# Patient Record
Sex: MALE | Race: BLACK OR AFRICAN AMERICAN | Employment: PART TIME | URBAN - METROPOLITAN AREA
[De-identification: names, ages, dates, MRNs, and addresses within clinical notes are randomized per-mention and may not be internally consistent; named-entity substitution may affect disease eponyms.]

---

## 2017-08-07 ENCOUNTER — OP HISTORICAL/CONVERTED ENCOUNTER (OUTPATIENT)
Dept: OTHER | Age: 32
End: 2017-08-07

## 2017-10-02 ENCOUNTER — OP HISTORICAL/CONVERTED ENCOUNTER (OUTPATIENT)
Dept: OTHER | Age: 32
End: 2017-10-02

## 2019-08-21 ENCOUNTER — OFFICE VISIT (OUTPATIENT)
Dept: FAMILY MEDICINE CLINIC | Age: 34
End: 2019-08-21

## 2019-08-21 VITALS
BODY MASS INDEX: 27.3 KG/M2 | OXYGEN SATURATION: 99 % | HEART RATE: 59 BPM | RESPIRATION RATE: 16 BRPM | DIASTOLIC BLOOD PRESSURE: 68 MMHG | HEIGHT: 71 IN | TEMPERATURE: 98.1 F | WEIGHT: 195 LBS | SYSTOLIC BLOOD PRESSURE: 103 MMHG

## 2019-08-21 DIAGNOSIS — R55 VASOVAGAL SYNCOPE: ICD-10-CM

## 2019-08-21 DIAGNOSIS — F12.10 MARIJUANA ABUSE: ICD-10-CM

## 2019-08-21 DIAGNOSIS — Z00.00 WELL ADULT EXAM: Primary | ICD-10-CM

## 2019-08-21 DIAGNOSIS — N43.3 BILATERAL HYDROCELE: ICD-10-CM

## 2019-08-21 NOTE — PATIENT INSTRUCTIONS
Medhat Bragg M.D.  [address]  [address] [facility], [address]  Phone: [phone]  Fax: [phone]

## 2019-08-21 NOTE — PROGRESS NOTES
Lou Mathews is an 30 y.o. male who presents for well male exam and to discuss bilateral hydrocele and to discuss syncopal episode. Previous PCP: Adam Marie    Doing well. No complaints. Sexually active: Yes, female partners. Method of protection: condoms most of the time     Diet: good mix of carbohydrates, protein and fruits. 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 Bilateral hydroceles: Pt had U/S done at [facility] (8/2017). Personally reviewed U/S result: Tiny simple cysts present in left testis 4 and 2 mm. Pt denies problems with urinary frequency, urgency and dysuria. Denies scrotal pain. Hx of STDs: Hx of Chlamydia >5 years ago: treated appropriately per Pt. Pt would like to have gonorrhea and chlamydia done today. Syncope: Pt states that he had a syncopal episode after he donated plasma a few months ago. Did not go to urgent care or ED for evaluation. Pt states that he did not hydrate and did not eat after he donated plasma. Syncope lasted for a few min and Pt returned back to baseline immediately after. Also states that he smoked marijuana which may have contributed. He denies recent syncope, HA, dizziness, lightheadedness, SOB, CP and palpitations    Social Hx: smokes 3 black and milds daily and marijuana weekly. Has 3-4 beers a week. Pt denies any other drug use. Allergies- reviewed:   No Known Allergies      Medications- reviewed:   No current outpatient medications on file. No current facility-administered medications for this visit. Past Medical History- reviewed:  History reviewed. No pertinent past medical history.       Past Surgical History- reviewed:   Past Surgical History:   Procedure Laterality Date    HX HERNIA REPAIR      testicular hernia when he was a baby      Family History - reviewed:  Family History   Problem Relation Age of Onset    Hypertension Mother     Diabetes Father     Heart Disease Father Social History - reviewed:  Social History     Socioeconomic History    Marital status: SINGLE     Spouse name: Not on file    Number of children: Not on file    Years of education: Not on file    Highest education level: Not on file   Occupational History    Not on file   Social Needs    Financial resource strain: Not on file    Food insecurity:     Worry: Not on file     Inability: Not on file    Transportation needs:     Medical: Not on file     Non-medical: Not on file   Tobacco Use    Smoking status: Never Smoker    Smokeless tobacco: Never Used   Substance and Sexual Activity    Alcohol use: Yes     Comment: occasionally    Drug use: Yes     Types: Marijuana    Sexual activity: Yes     Partners: Female     Birth control/protection: Condom   Lifestyle    Physical activity:     Days per week: Not on file     Minutes per session: Not on file    Stress: Not on file   Relationships    Social connections:     Talks on phone: Not on file     Gets together: Not on file     Attends Jehovah's witness service: Not on file     Active member of club or organization: Not on file     Attends meetings of clubs or organizations: Not on file     Relationship status: Not on file    Intimate partner violence:     Fear of current or ex partner: Not on file     Emotionally abused: Not on file     Physically abused: Not on file     Forced sexual activity: Not on file   Other Topics Concern    Not on file   Social History Narrative    Not on file     Immunizations - reviewed: There is no immunization history on file for this patient.     Health Maintenance reviewed -   HIV testing: would like to get done today    Review of Systems   CONSTITUTIONAL: Denies: fever, chills, weakness, fatigue  EYES: Denies: blurry vision, decreased vision  ENT: Denies: sore throat, nasal congestion, nasal discharge, epistaxis  CARDIOVASCULAR: Denies: chest pain, dyspnea on exertion  RESPIRATORY: Denies: cough, shortness of breath  ENDOCRINE: Denies: polydipsia/polyuria, palpitations  GI: Denies: abdominal pain, flank pain, nausea, vomiting, diarrhea, constipation  : Denies: dysuria, frequency/urgency  NEURO: Denies: dizzy/vertigo, headache  MUSCULOSKELETAL: Denies: back pain, joint pain, muscle pain  SKIN: Denies: rash, itching  PSYCH: Denies: anxiety, depression    Objective:     Visit Vitals  /68   Pulse (!) 59   Temp 98.1 °F (36.7 °C) (Oral)   Resp 16   Ht 5' 11\" (1.803 m)   Wt 195 lb (88.5 kg)   SpO2 99%   BMI 27.20 kg/m²     General appearance - alert, well appearing, and in no distress  Eyes - pupils equal and reactive, extraocular eye movements intact  Ears - bilateral TM's and external ear canals normal  Nose - normal and patent, no erythema, discharge or polyps  Mouth - mucous membranes moist, pharynx normal without lesions  Neck - supple, no significant adenopathy  Chest - clear to auscultation, no wheezes, rales or rhonchi, symmetric air entry  Heart - normal rate, regular rhythm, normal S1, S2, no murmurs, rubs, clicks or gallops  Abdomen - soft, nontender, nondistended, no masses or organomegaly  Neurological - alert, oriented, normal speech, no focal findings or movement disorder noted  Musculoskeletal - no joint tenderness, deformity or swelling  Extremities - peripheral pulses normal, no pedal edema, no clubbing or cyanosis  Skin - normal coloration and turgor, no rashes, no suspicious skin lesions noted  : small palpable nodules (pea sized) palpated on bilateral testes. Assessment:       ICD-10-CM ICD-9-CM    1. Well adult exam Z00.00 V70.0 CBC W/O DIFF      METABOLIC PANEL, COMPREHENSIVE      HIV 1/2 AG/AB, 4TH GENERATION,W RFLX CONFIRM      CHLAMYDIA/GC PCR      LIPID PANEL   2. Bilateral hydrocele N43.3 603.9 REFERRAL TO UROLOGY   3. Vasovagal syncope R55 780.2    4. Marijuana abuse F12.10 305.20      Plan:   1.  Well adult exam:   - Counseled re: diet, exercise, healthy lifestyle  - Appropriate labs, vaccines, imaging studies, and referrals ordered as listed above  - The patient was counseled on the dangers of tobacco use, and was advised to quit. Reviewed strategies to maximize success, including written materials  - Counseled pt on about dangers of marijuana use and dangers of smoking. 2. Bilateral hydrocele: U/S scrotum: Tiny simple cysts present in left testis 4 and 2 mm. Pt is asymptomatic.  - Referred pt to urology per Pt request.     3. Vasovagal syncope: likely vasovagal after plasma donation and lack of PO hydration after.   - f/u in 2 weeks for further workup or go to ED if symptoms reoccur. I have discussed the diagnosis with the patient and the intended plan as seen in the above orders. The patient has received an after-visit summary and questions were answered concerning future plans. I have discussed medication side effects and warnings with the patient as well. Informed pt to return to the office if new symptoms arise. John Khan MD  Family Medicine Resident  PGY-3    Patient seen with Dr. Gloria Mario, attending physician.

## 2019-08-22 LAB
ALBUMIN SERPL-MCNC: 4.9 G/DL (ref 3.5–5.5)
ALBUMIN/GLOB SERPL: 2.3 {RATIO} (ref 1.2–2.2)
ALP SERPL-CCNC: 73 IU/L (ref 39–117)
ALT SERPL-CCNC: 43 IU/L (ref 0–44)
AST SERPL-CCNC: 29 IU/L (ref 0–40)
BILIRUB SERPL-MCNC: 0.3 MG/DL (ref 0–1.2)
BUN SERPL-MCNC: 13 MG/DL (ref 6–20)
BUN/CREAT SERPL: 12 (ref 9–20)
C TRACH RRNA SPEC QL NAA+PROBE: NEGATIVE
CALCIUM SERPL-MCNC: 9.6 MG/DL (ref 8.7–10.2)
CHLORIDE SERPL-SCNC: 104 MMOL/L (ref 96–106)
CHOLEST SERPL-MCNC: 177 MG/DL (ref 100–199)
CO2 SERPL-SCNC: 23 MMOL/L (ref 20–29)
CREAT SERPL-MCNC: 1.06 MG/DL (ref 0.76–1.27)
ERYTHROCYTE [DISTWIDTH] IN BLOOD BY AUTOMATED COUNT: 13.2 % (ref 12.3–15.4)
GLOBULIN SER CALC-MCNC: 2.1 G/DL (ref 1.5–4.5)
GLUCOSE SERPL-MCNC: 94 MG/DL (ref 65–99)
HCT VFR BLD AUTO: 43.9 % (ref 37.5–51)
HDLC SERPL-MCNC: 53 MG/DL
HGB BLD-MCNC: 14.6 G/DL (ref 13–17.7)
HIV 1+2 AB+HIV1 P24 AG SERPL QL IA: NON REACTIVE
INTERPRETATION, 910389: NORMAL
LDLC SERPL CALC-MCNC: 113 MG/DL (ref 0–99)
MCH RBC QN AUTO: 30 PG (ref 26.6–33)
MCHC RBC AUTO-ENTMCNC: 33.3 G/DL (ref 31.5–35.7)
MCV RBC AUTO: 90 FL (ref 79–97)
N GONORRHOEA RRNA SPEC QL NAA+PROBE: NEGATIVE
PLATELET # BLD AUTO: 185 X10E3/UL (ref 150–450)
POTASSIUM SERPL-SCNC: 4.4 MMOL/L (ref 3.5–5.2)
PROT SERPL-MCNC: 7 G/DL (ref 6–8.5)
RBC # BLD AUTO: 4.86 X10E6/UL (ref 4.14–5.8)
SODIUM SERPL-SCNC: 141 MMOL/L (ref 134–144)
TRIGL SERPL-MCNC: 57 MG/DL (ref 0–149)
VLDLC SERPL CALC-MCNC: 11 MG/DL (ref 5–40)
WBC # BLD AUTO: 4.8 X10E3/UL (ref 3.4–10.8)

## 2019-08-27 NOTE — PROGRESS NOTES
CBC, CMP wnl  HIV NR, neg Gonorrhea and chlamydia  LDL:113: Recommend lifestyle changes    Results sent to Pt via mail.

## 2022-03-19 PROBLEM — N43.3 BILATERAL HYDROCELE: Status: ACTIVE | Noted: 2019-08-21

## 2023-06-06 ENCOUNTER — OFFICE VISIT (OUTPATIENT)
Facility: CLINIC | Age: 38
End: 2023-06-06
Payer: COMMERCIAL

## 2023-06-06 VITALS
WEIGHT: 217 LBS | HEIGHT: 71 IN | SYSTOLIC BLOOD PRESSURE: 129 MMHG | TEMPERATURE: 98.3 F | RESPIRATION RATE: 18 BRPM | BODY MASS INDEX: 30.38 KG/M2 | DIASTOLIC BLOOD PRESSURE: 82 MMHG | HEART RATE: 75 BPM | OXYGEN SATURATION: 99 %

## 2023-06-06 DIAGNOSIS — Z11.4 SCREENING FOR HIV WITHOUT PRESENCE OF RISK FACTORS: ICD-10-CM

## 2023-06-06 DIAGNOSIS — Z11.59 NEED FOR HEPATITIS C SCREENING TEST: ICD-10-CM

## 2023-06-06 DIAGNOSIS — Z76.89 ENCOUNTER TO ESTABLISH CARE: ICD-10-CM

## 2023-06-06 DIAGNOSIS — N52.9 ERECTILE DYSFUNCTION, UNSPECIFIED ERECTILE DYSFUNCTION TYPE: Primary | ICD-10-CM

## 2023-06-06 PROCEDURE — 99204 OFFICE O/P NEW MOD 45 MIN: CPT | Performed by: NURSE PRACTITIONER

## 2023-06-06 SDOH — ECONOMIC STABILITY: FOOD INSECURITY: WITHIN THE PAST 12 MONTHS, THE FOOD YOU BOUGHT JUST DIDN'T LAST AND YOU DIDN'T HAVE MONEY TO GET MORE.: NEVER TRUE

## 2023-06-06 SDOH — ECONOMIC STABILITY: HOUSING INSECURITY
IN THE LAST 12 MONTHS, WAS THERE A TIME WHEN YOU DID NOT HAVE A STEADY PLACE TO SLEEP OR SLEPT IN A SHELTER (INCLUDING NOW)?: NO

## 2023-06-06 SDOH — ECONOMIC STABILITY: INCOME INSECURITY: HOW HARD IS IT FOR YOU TO PAY FOR THE VERY BASICS LIKE FOOD, HOUSING, MEDICAL CARE, AND HEATING?: NOT HARD AT ALL

## 2023-06-06 SDOH — ECONOMIC STABILITY: FOOD INSECURITY: WITHIN THE PAST 12 MONTHS, YOU WORRIED THAT YOUR FOOD WOULD RUN OUT BEFORE YOU GOT MONEY TO BUY MORE.: NEVER TRUE

## 2023-06-06 ASSESSMENT — PATIENT HEALTH QUESTIONNAIRE - PHQ9
SUM OF ALL RESPONSES TO PHQ QUESTIONS 1-9: 0
2. FEELING DOWN, DEPRESSED OR HOPELESS: 0
SUM OF ALL RESPONSES TO PHQ9 QUESTIONS 1 & 2: 0
SUM OF ALL RESPONSES TO PHQ QUESTIONS 1-9: 0
SUM OF ALL RESPONSES TO PHQ QUESTIONS 1-9: 0
1. LITTLE INTEREST OR PLEASURE IN DOING THINGS: 0
SUM OF ALL RESPONSES TO PHQ QUESTIONS 1-9: 0

## 2023-06-06 NOTE — PROGRESS NOTES
Pt Is here for   Chief Complaint   Patient presents with    New Patient     Establishing care     Erectile Dysfunction     Bilateral hydrocele. . states that it feels like little beads in his sac     1. Have you been to the ER, urgent care clinic since your last visit? Hospitalized since your last visit? No    2. Have you seen or consulted any other health care providers outside of the [address] [place] [address] since your last visit? Include any pap smears or colon screening.  No
Roddy Avilez, NP

## 2023-06-07 DIAGNOSIS — Z11.4 SCREENING FOR HIV WITHOUT PRESENCE OF RISK FACTORS: ICD-10-CM

## 2023-06-07 DIAGNOSIS — Z11.59 NEED FOR HEPATITIS C SCREENING TEST: ICD-10-CM

## 2023-06-07 DIAGNOSIS — N52.9 ERECTILE DYSFUNCTION, UNSPECIFIED ERECTILE DYSFUNCTION TYPE: ICD-10-CM

## 2023-06-08 LAB
BASOPHILS # BLD: 0 K/UL (ref 0–0.1)
BASOPHILS NFR BLD: 0 % (ref 0–1)
CALCIUM SERPL-MCNC: 9.6 MG/DL (ref 8.5–10.1)
CHOLEST SERPL-MCNC: 190 MG/DL
DIFFERENTIAL METHOD BLD: NORMAL
EOSINOPHIL # BLD: 0.1 K/UL (ref 0–0.4)
EOSINOPHIL NFR BLD: 2 % (ref 0–7)
ERYTHROCYTE [DISTWIDTH] IN BLOOD BY AUTOMATED COUNT: 12.8 % (ref 11.5–14.5)
EST. AVERAGE GLUCOSE BLD GHB EST-MCNC: 114 MG/DL
HBA1C MFR BLD: 5.6 % (ref 4–5.6)
HCT VFR BLD AUTO: 47.9 % (ref 36.6–50.3)
HDLC SERPL-MCNC: 45 MG/DL
HDLC SERPL: 4.2 (ref 0–5)
HGB BLD-MCNC: 15.7 G/DL (ref 12.1–17)
HIV 1+2 AB+HIV1 P24 AG SERPL QL IA: NONREACTIVE
HIV 1/2 RESULT COMMENT: NORMAL
IMM GRANULOCYTES # BLD AUTO: 0 K/UL (ref 0–0.04)
IMM GRANULOCYTES NFR BLD AUTO: 0 % (ref 0–0.5)
LDLC SERPL CALC-MCNC: 116.2 MG/DL (ref 0–100)
LYMPHOCYTES # BLD: 3 K/UL (ref 0.8–3.5)
LYMPHOCYTES NFR BLD: 43 % (ref 12–49)
MCH RBC QN AUTO: 30.5 PG (ref 26–34)
MCHC RBC AUTO-ENTMCNC: 32.8 G/DL (ref 30–36.5)
MCV RBC AUTO: 93.2 FL (ref 80–99)
MONOCYTES # BLD: 0.5 K/UL (ref 0–1)
MONOCYTES NFR BLD: 7 % (ref 5–13)
NEUTS SEG # BLD: 3.3 K/UL (ref 1.8–8)
NEUTS SEG NFR BLD: 48 % (ref 32–75)
NRBC # BLD: 0 K/UL (ref 0–0.01)
NRBC BLD-RTO: 0 PER 100 WBC
PLATELET # BLD AUTO: 235 K/UL (ref 150–400)
PMV BLD AUTO: 10.7 FL (ref 8.9–12.9)
PSA SERPL-MCNC: 0.8 NG/ML (ref 0.01–4)
PTH-INTACT SERPL-MCNC: 59.7 PG/ML (ref 18.4–88)
RBC # BLD AUTO: 5.14 M/UL (ref 4.1–5.7)
TRIGL SERPL-MCNC: 144 MG/DL
TSH SERPL DL<=0.05 MIU/L-ACNC: 0.76 UIU/ML (ref 0.36–3.74)
VLDLC SERPL CALC-MCNC: 28.8 MG/DL
WBC # BLD AUTO: 6.9 K/UL (ref 4.1–11.1)

## 2023-06-09 LAB
HCV RNA, QN (IU/ML): NORMAL IU/ML
TEST INFORMATION: NORMAL

## 2023-06-09 NOTE — RESULT ENCOUNTER NOTE
Overall your labs look good, but... Your CHOLESTEROL is UP, with your LDL (bad cholesterol) over  goal level of 100 and HDL (good/protective) under 60. Stay active, eat a LOW-CARB diet (avoiding bread, rice, pasta, desserts, soda)  with fish and other lean meats, take a fish oil supplement with DHA and EPA daily, along with exercise to help reduce cholesterol and raise good cholesterol. Eating healthy nuts like walnuts, pecans, and ALMONDS may help also. You can also try PURPLE SEA WILKERSON (found in health food stores like [employer] [facility] and [employer]). TRY RED YEAST RICE (if your LDL is HIGH) OR Flush-free NIACIN 500mg (if your TRIG or up), once daily helps to improve your numbers too. Do NOT take Niacin or red yeast rice together, they interact with each other. Both are intended to be taken WITH FISH OIL however.

## 2023-06-10 LAB
TESTOST FREE SERPL-MCNC: 10.4 PG/ML (ref 8.7–25.1)
TESTOST SERPL-MCNC: 300 NG/DL (ref 264–916)

## 2023-07-30 PROBLEM — N52.9 ED (ERECTILE DYSFUNCTION): Status: ACTIVE | Noted: 2023-07-30

## 2023-07-30 NOTE — PROGRESS NOTES
HISTORY OF PRESENT ILLNESS    Steve Young is a 41 y.o. male is a new patient referred for ED  Labs  from 6/2023-Testosterone 300, free 10.4, PTH and TSH normal,PSA=0.8              Past Medical History:  PMHx (including negatives):  has no past medical history on file. PSurgHx:  has a past surgical history that includes hernia repair. PSocHx:  reports that he has been smoking cigarettes and cigars. He has been exposed to tobacco smoke. He has never used smokeless tobacco. He reports current alcohol use. He reports current drug use. Drug: Marijuana Humberto Lozano). @[facility]@     1. Erectile dysfunction, unspecified erectile dysfunction type  -     AMB POC URINALYSIS DIP STICK AUTO W/O MICRO  -     AMB POC PVR, SAROJ,POST-VOID RES,US,NON-IMAGING       Review of Systems   Musculoskeletal:  Positive for neck pain. All other systems reviewed and are negative. Patient denies the symptoms of COVID-19 per routine screening guidelines. Physical Exam    ASSESSMENT and John Marinelli was seen today for new patient and erectile dysfunction. Diagnoses and all orders for this visit:    Erectile dysfunction, unspecified erectile dysfunction type  -     AMB POC URINALYSIS DIP STICK AUTO W/O MICRO  -     AMB POC PVR, SAROJ,POST-VOID RES,US,NON-IMAGING    Other orders  -     tadalafil (CIALIS) 20 MG tablet; Take 1 tablet by mouth as needed for Erectile Dysfunction  -     ciprofloxacin (CIPRO) 500 MG tablet; Take 1 tablet by mouth 2 times daily  -     Discontinue: citalopram (CELEXA) 10 MG tablet; Take 1 tablet by mouth daily  -     citalopram (CELEXA) 10 MG tablet; Take 1 tab as needed before sexual intercourse     Patient was educated on side effects of above medication, ciprofloxacin ( tendon pain)  Celexa one tab as needed before sexual intercourse    Prostatitis is treated with self-care and antibiotics.       The patient has been educated on the condition and given an extended course of antibiotics and verbal and written

## 2023-08-01 ENCOUNTER — OFFICE VISIT (OUTPATIENT)
Age: 38
End: 2023-08-01
Payer: COMMERCIAL

## 2023-08-01 VITALS
OXYGEN SATURATION: 99 % | BODY MASS INDEX: 29.39 KG/M2 | DIASTOLIC BLOOD PRESSURE: 77 MMHG | HEART RATE: 71 BPM | RESPIRATION RATE: 16 BRPM | TEMPERATURE: 98.2 F | WEIGHT: 217 LBS | HEIGHT: 72 IN | SYSTOLIC BLOOD PRESSURE: 126 MMHG

## 2023-08-01 DIAGNOSIS — N52.9 ERECTILE DYSFUNCTION, UNSPECIFIED ERECTILE DYSFUNCTION TYPE: ICD-10-CM

## 2023-08-01 LAB
BILIRUBIN, URINE, POC: NEGATIVE
BLOOD URINE, POC: NEGATIVE
GLUCOSE URINE, POC: NEGATIVE
KETONES, URINE, POC: NEGATIVE
LEUKOCYTE ESTERASE, URINE, POC: NEGATIVE
NITRITE, URINE, POC: NEGATIVE
PH, URINE, POC: 5.5 (ref 4.6–8)
PROTEIN,URINE, POC: NEGATIVE
PVR, POC: NORMAL CC
SPECIFIC GRAVITY, URINE, POC: 1.03 (ref 1–1.03)
URINALYSIS CLARITY, POC: CLEAR
URINALYSIS COLOR, POC: YELLOW
UROBILINOGEN, POC: NORMAL

## 2023-08-01 PROCEDURE — 51798 US URINE CAPACITY MEASURE: CPT | Performed by: UROLOGY

## 2023-08-01 PROCEDURE — 99204 OFFICE O/P NEW MOD 45 MIN: CPT | Performed by: UROLOGY

## 2023-08-01 PROCEDURE — 81003 URINALYSIS AUTO W/O SCOPE: CPT | Performed by: UROLOGY

## 2023-08-01 RX ORDER — CITALOPRAM HYDROBROMIDE 10 MG/1
10 TABLET ORAL DAILY
Qty: 30 TABLET | Refills: 2 | Status: SHIPPED | OUTPATIENT
Start: 2023-08-01 | End: 2023-08-01 | Stop reason: ALTCHOICE

## 2023-08-01 RX ORDER — CIPROFLOXACIN 500 MG/1
500 TABLET, FILM COATED ORAL 2 TIMES DAILY
Qty: 60 TABLET | Refills: 0 | Status: SHIPPED | OUTPATIENT
Start: 2023-08-01 | End: 2023-08-31

## 2023-08-01 RX ORDER — TADALAFIL 20 MG/1
20 TABLET ORAL PRN
Qty: 30 TABLET | Refills: 1 | Status: SHIPPED | OUTPATIENT
Start: 2023-08-01 | End: 2024-07-31

## 2023-08-01 RX ORDER — CITALOPRAM HYDROBROMIDE 10 MG/1
TABLET ORAL
Qty: 30 TABLET | Refills: 2 | Status: SHIPPED | OUTPATIENT
Start: 2023-08-01

## 2023-08-01 NOTE — PATIENT INSTRUCTIONS
Prostatitis: Care Instructions    Overview       The prostate gland is a small, walnut-shaped organ. It lies just below a man's bladder. It surrounds the urethra, the tube that carries urine through the penis and out of the body. Prostatitis is a painful condition caused by inflammation or infection of the prostate gland. Sometimes the condition is caused by bacteria, but often the cause is not known. You may feel an increase in the amount of times you need to empty your bladder, or experience urinary urgency. You may have some burning with urination. You may have pain around the base of the penis and/or behind the scrotum or in the testicles, pain in the lower back or lower abdomen, and the feeling of a full rectum. Prostatitis is usually caused by bacteria and is treated with self-care and antibiotics. You may need extended courses of antibiotics (21-30 days). Your symptoms may not improve until you have completed a prolonged course of antibiotics and sometimes you will need to repeat this treatment. In some instances, a person can require 8-12 weeks of treatment. How can you care for yourself at home? If your doctor prescribed antibiotics, take them as directed. Do not stop taking them just because you feel better. You need to take the full course of antibiotics. Take an over-the-counter pain medicine, such as acetaminophen (Tylenol), ibuprofen (Advil, Motrin), or naproxen (Aleve). Be safe with medicines. Read and follow all instructions on the label. Take warm baths to help soothe pain. Straining to pass stools can hurt when your prostate is inflamed. Avoid constipation. Include fruits, vegetables, beans, and whole grains in your diet each day. These foods are high in fiber. Drink plenty of fluids. If you have kidney, heart, or liver disease and have to limit fluids, talk with your doctor before you increase the amount of fluids you drink. Get some exercise every day.  Build up slowly to

## 2023-08-01 NOTE — PROGRESS NOTES
HISTORY OF PRESENT ILLNESS  Claudio Keene is a 41 y.o. male. Chief Complaint   Patient presents with    New Patient    Erectile Dysfunction   Claudio Keene is a 41 y.o. male is a new patient referred for ED  Labs  from 6/2023-Testosterone 300, free 10.4, PTH and TSH normal,PSA=0.8   Patient does drive quite a bit or has no past     Past Medical History:  PMHx (including negatives):  has no past medical history on file. PSurgHx:  has a past surgical history that includes hernia repair. PSocHx:  reports that he has been smoking cigarettes and cigars. He has been exposed to tobacco smoke. He has never used smokeless tobacco. He reports current alcohol use. He reports current drug use. Drug: Marijuana Lisa Chars). Patient here today with symptoms of prostatitis. He has the following LUTS: urgency, frequency, dysuria, pain or discomfort in the scrotum or testes or groin. He does also report sexual dysfunction or ED. Patient spends a considerable amount of time in a vehicle. He reports high-normal or high levels of stress or anxiety. He consumes caffeine. [unfilled]         Review of Systems    Patient denies the symptoms of COVID-19 per routine screening guidelines. : see HPI    Physical Exam  Vitals and nursing note reviewed. Constitutional:       Appearance: Normal appearance. HENT:      Head: Normocephalic. Nose: Nose normal.      Mouth/Throat:      Mouth: Mucous membranes are moist.   Eyes:      Pupils: Pupils are equal, round, and reactive to light. Cardiovascular:      Rate and Rhythm: Normal rate and regular rhythm. Pulmonary:      Effort: Pulmonary effort is normal.   Abdominal:      General: Abdomen is flat. Palpations: Abdomen is soft. Genitourinary:     Penis: Normal.       Testes: Normal.      Comments: Normal testicles very tender prostate  Musculoskeletal:         General: Normal range of motion. Cervical back: Normal range of motion.

## 2023-09-07 PROBLEM — N41.9 PROSTATITIS: Status: ACTIVE | Noted: 2023-09-07

## 2023-09-07 PROBLEM — Z12.5 SCREENING PSA (PROSTATE SPECIFIC ANTIGEN): Status: ACTIVE | Noted: 2023-09-07

## 2023-09-07 NOTE — PROGRESS NOTES
HISTORY OF PRESENT ILLNESS  Steve Young is a 41 y.o. male   History of erectile dysfunction. He is better as far as urinating erections are little bit better. He did not try the Cialis so he wants to. We talked about cost that he should go to [address] [facility] or to go to [employer]. So I sent the prescription form for Cialis to [facility]. Additionally he is using the gel seat anti-inflammatories and to let him try the Cialis see me back in 3 months and will go from there      1. Erectile dysfunction, unspecified erectile dysfunction type  Overview:  Component 6/7/23 1604   Testosterone 300    Testosterone, Free 10.4      On Cialis. Orders:  -     AMB POC URINALYSIS DIP STICK AUTO W/O MICRO  -     tadalafil (CIALIS) 20 MG tablet; Take 1 tablet by mouth as needed for Erectile Dysfunction, Disp-60 tablet, R-1Normal  2. Prostatitis, unspecified prostatitis type  Overview:  Reported LUTS symptoms - urgency, frequency, dysuria, and ED. Prescribed Cipro. Discussed causes and ways to prevent reoccurrence of prostatitis. 3. Screening PSA (prostate specific antigen)  Overview:  Component 6/7/23 1604   PSA 0.8          PAST MEDICAL HISTORY  PMHx (including negatives):  has no past medical history on file. PSurgHx:  has a past surgical history that includes hernia repair. PSocHx:  reports that he has been smoking cigarettes and cigars. He has been exposed to tobacco smoke. He has never used smokeless tobacco. He reports current alcohol use. He reports current drug use. Drug: Marijuana Marletta Hoeugenio). FamilyHX:   Family History   Problem Relation Age of Onset    Diabetes Father     Hypertension Mother     Heart Disease Father        ROS  Review of Systems   All other systems reviewed and are negative. PHYSICAL EXAM  Physical Exam  Vitals and nursing note reviewed. Constitutional:       Appearance: Normal appearance. HENT:      Head: Normocephalic.       Nose: Nose normal.      Mouth/Throat:

## 2023-09-11 ENCOUNTER — OFFICE VISIT (OUTPATIENT)
Age: 38
End: 2023-09-11
Payer: COMMERCIAL

## 2023-09-11 VITALS
OXYGEN SATURATION: 98 % | HEART RATE: 65 BPM | RESPIRATION RATE: 16 BRPM | TEMPERATURE: 98.2 F | SYSTOLIC BLOOD PRESSURE: 114 MMHG | DIASTOLIC BLOOD PRESSURE: 70 MMHG

## 2023-09-11 DIAGNOSIS — N52.9 ERECTILE DYSFUNCTION, UNSPECIFIED ERECTILE DYSFUNCTION TYPE: Primary | ICD-10-CM

## 2023-09-11 DIAGNOSIS — Z12.5 SCREENING PSA (PROSTATE SPECIFIC ANTIGEN): ICD-10-CM

## 2023-09-11 DIAGNOSIS — N41.9 PROSTATITIS, UNSPECIFIED PROSTATITIS TYPE: ICD-10-CM

## 2023-09-11 LAB
BILIRUBIN, URINE, POC: NEGATIVE
BLOOD URINE, POC: NEGATIVE
GLUCOSE URINE, POC: NEGATIVE
KETONES, URINE, POC: NEGATIVE
LEUKOCYTE ESTERASE, URINE, POC: NEGATIVE
NITRITE, URINE, POC: NEGATIVE
PH, URINE, POC: 5.5 (ref 4.6–8)
PROTEIN,URINE, POC: NEGATIVE
SPECIFIC GRAVITY, URINE, POC: 1.02 (ref 1–1.03)
URINALYSIS CLARITY, POC: CLEAR
URINALYSIS COLOR, POC: YELLOW
UROBILINOGEN, POC: NORMAL

## 2023-09-11 PROCEDURE — 99214 OFFICE O/P EST MOD 30 MIN: CPT | Performed by: UROLOGY

## 2023-09-11 PROCEDURE — 81003 URINALYSIS AUTO W/O SCOPE: CPT | Performed by: UROLOGY

## 2023-09-11 RX ORDER — TADALAFIL 20 MG/1
20 TABLET ORAL PRN
Qty: 60 TABLET | Refills: 1 | Status: SHIPPED | OUTPATIENT
Start: 2023-09-11 | End: 2024-09-10